# Patient Record
Sex: FEMALE | Race: WHITE
[De-identification: names, ages, dates, MRNs, and addresses within clinical notes are randomized per-mention and may not be internally consistent; named-entity substitution may affect disease eponyms.]

---

## 2018-03-06 ENCOUNTER — HOSPITAL ENCOUNTER (INPATIENT)
Dept: HOSPITAL 12 - SRC | Age: 23
LOS: 6 days | Discharge: OTHER INSTITUTION | DRG: 895 | End: 2018-03-12
Attending: INTERNAL MEDICINE | Admitting: INTERNAL MEDICINE
Payer: COMMERCIAL

## 2018-03-06 VITALS — BODY MASS INDEX: 33.34 KG/M2 | WEIGHT: 220 LBS | HEIGHT: 68 IN

## 2018-03-06 DIAGNOSIS — E87.1: ICD-10-CM

## 2018-03-06 DIAGNOSIS — E87.6: ICD-10-CM

## 2018-03-06 DIAGNOSIS — Z81.1: ICD-10-CM

## 2018-03-06 DIAGNOSIS — F11.23: Primary | ICD-10-CM

## 2018-03-06 DIAGNOSIS — Z87.892: ICD-10-CM

## 2018-03-06 DIAGNOSIS — F13.239: ICD-10-CM

## 2018-03-06 DIAGNOSIS — F41.9: ICD-10-CM

## 2018-03-06 DIAGNOSIS — G47.00: ICD-10-CM

## 2018-03-06 DIAGNOSIS — Z88.6: ICD-10-CM

## 2018-03-06 DIAGNOSIS — Z81.8: ICD-10-CM

## 2018-03-06 DIAGNOSIS — Z79.01: ICD-10-CM

## 2018-03-06 DIAGNOSIS — Z59.0: ICD-10-CM

## 2018-03-06 DIAGNOSIS — E83.42: ICD-10-CM

## 2018-03-06 DIAGNOSIS — Z59.1: ICD-10-CM

## 2018-03-06 DIAGNOSIS — F17.210: ICD-10-CM

## 2018-03-06 DIAGNOSIS — R74.0: ICD-10-CM

## 2018-03-06 DIAGNOSIS — I27.82: ICD-10-CM

## 2018-03-06 LAB
ALP SERPL-CCNC: 71 U/L (ref 50–136)
ALT SERPL W/O P-5'-P-CCNC: 68 U/L (ref 14–59)
AMPHETAMINES UR QL SCN>1000 NG/ML: NEGATIVE
AST SERPL-CCNC: 42 U/L (ref 15–37)
BARBITURATES UR QL SCN: NEGATIVE
BASOPHILS # BLD AUTO: 0 K/UL (ref 0–8)
BASOPHILS NFR BLD AUTO: 0.7 % (ref 0–2)
BILIRUB SERPL-MCNC: 0.7 MG/DL (ref 0.2–1)
BUN SERPL-MCNC: 14 MG/DL (ref 7–18)
CHLORIDE SERPL-SCNC: 101 MMOL/L (ref 98–107)
CO2 SERPL-SCNC: 25 MMOL/L (ref 21–32)
COCAINE UR QL SCN: NEGATIVE
CREAT SERPL-MCNC: 1 MG/DL (ref 0.6–1.3)
EOSINOPHIL # BLD AUTO: 0.2 K/UL (ref 0–0.7)
EOSINOPHIL NFR BLD AUTO: 3.3 % (ref 0–7)
ETHANOL SERPL-MCNC: < 3 MG/DL (ref 0–0)
GLUCOSE SERPL-MCNC: 117 MG/DL (ref 74–106)
HCG UR QL: NEGATIVE
HCT VFR BLD AUTO: 35.5 % (ref 31.2–41.9)
HGB BLD-MCNC: 12 G/DL (ref 10.9–14.3)
LYMPHOCYTES # BLD AUTO: 1.7 K/UL (ref 20–40)
LYMPHOCYTES NFR BLD AUTO: 24.4 % (ref 20.5–51.5)
MAGNESIUM SERPL-MCNC: 1.9 MG/DL (ref 1.8–2.4)
MCH RBC QN AUTO: 27 UUG (ref 24.7–32.8)
MCHC RBC AUTO-ENTMCNC: 34 G/DL (ref 32.3–35.6)
MCV RBC AUTO: 79.7 FL (ref 75.5–95.3)
MONOCYTES # BLD AUTO: 0.5 K/UL (ref 2–10)
MONOCYTES NFR BLD AUTO: 7.3 % (ref 0–11)
NEUTROPHILS # BLD AUTO: 4.4 K/UL (ref 1.8–8.9)
NEUTROPHILS NFR BLD AUTO: 64.3 % (ref 38.5–71.5)
OPIATES UR QL SCN: POSITIVE
PCP UR QL SCN>25 NG/ML: NEGATIVE
PLATELET # BLD AUTO: 271 K/UL (ref 179–408)
POTASSIUM SERPL-SCNC: 2.8 MMOL/L (ref 3.5–5.1)
RBC # BLD AUTO: 4.45 MIL/UL (ref 3.63–4.92)
THC UR QL SCN>50 NG/ML: NEGATIVE
WBC # BLD AUTO: 6.8 K/UL (ref 3.8–11.8)
WS STN SPEC: 7.3 G/DL (ref 6.4–8.2)

## 2018-03-06 PROCEDURE — HZ2ZZZZ DETOXIFICATION SERVICES FOR SUBSTANCE ABUSE TREATMENT: ICD-10-PCS | Performed by: INTERNAL MEDICINE

## 2018-03-06 PROCEDURE — A4663 DIALYSIS BLOOD PRESSURE CUFF: HCPCS

## 2018-03-06 PROCEDURE — G0480 DRUG TEST DEF 1-7 CLASSES: HCPCS

## 2018-03-06 RX ADMIN — GABAPENTIN SCH MG: 300 CAPSULE ORAL at 21:14

## 2018-03-06 SDOH — ECONOMIC STABILITY - HOUSING INSECURITY: HOMELESSNESS: Z59.0

## 2018-03-06 SDOH — ECONOMIC STABILITY - HOUSING INSECURITY: INADEQUATE HOUSING: Z59.1

## 2018-03-06 NOTE — NUR
INTAKE ASSESSMENT



BP: 142/62 HR:101 RR:18, SpO2: 95% T:98.0



Pt is in stable condition and able to be admitted on the unit.  Unit protocols regarding 
mediation administration and vital signs every 4 hours were explained.  Pt verbalized 
understanding.  Will continue admission upon arrival on the unit.

## 2018-03-06 NOTE — NUR
POTASSIUM



Pt noted with potassium 2.8.  MD made aware with new order for K-DUR 40meQ administered as 
ordered.  Breathing is even and unlabored, safety measures in place.  Will continue to 
monitor.
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
## 2018-03-06 NOTE — NUR
REASSESSMENT



Pt lying in bed with eyes closed noted to be asleep.  Breathing is even and unlabored.  
Safety measures in place.  Will continue to monitor.

## 2018-03-06 NOTE — NUR
ONE TIME SUBUTEX/ATIVAN



Pt complains of nausea, body aches, chills, sweats, anxiety, agitation, and irritability.  
Pt noted with flushed face and facial grimacing.  COWS: 12, CIWA:11.  One time Subutex and 
Ativan administered as ordered.  Safety measures in place.  Will continue to monitor.

## 2018-03-07 VITALS — SYSTOLIC BLOOD PRESSURE: 109 MMHG | DIASTOLIC BLOOD PRESSURE: 68 MMHG

## 2018-03-07 VITALS — DIASTOLIC BLOOD PRESSURE: 73 MMHG | SYSTOLIC BLOOD PRESSURE: 133 MMHG

## 2018-03-07 VITALS — SYSTOLIC BLOOD PRESSURE: 122 MMHG | DIASTOLIC BLOOD PRESSURE: 71 MMHG

## 2018-03-07 VITALS — SYSTOLIC BLOOD PRESSURE: 124 MMHG | DIASTOLIC BLOOD PRESSURE: 89 MMHG

## 2018-03-07 VITALS — SYSTOLIC BLOOD PRESSURE: 115 MMHG | DIASTOLIC BLOOD PRESSURE: 72 MMHG

## 2018-03-07 RX ADMIN — Medication SCH EA: at 09:51

## 2018-03-07 RX ADMIN — CLONIDINE HYDROCHLORIDE PRN MG: 0.1 TABLET ORAL at 20:32

## 2018-03-07 RX ADMIN — LORAZEPAM PRN MG: 1 TABLET ORAL at 17:14

## 2018-03-07 RX ADMIN — GABAPENTIN SCH MG: 300 CAPSULE ORAL at 20:32

## 2018-03-07 RX ADMIN — LORAZEPAM PRN MG: 1 TABLET ORAL at 12:14

## 2018-03-07 RX ADMIN — LORAZEPAM PRN MG: 1 TABLET ORAL at 09:22

## 2018-03-07 RX ADMIN — LORAZEPAM PRN MG: 1 TABLET ORAL at 04:29

## 2018-03-07 RX ADMIN — METHOCARBAMOL TABLETS PRN MG: 750 TABLET, COATED ORAL at 04:29

## 2018-03-07 RX ADMIN — BUPRENORPHINE HYDROCHLORIDE SCH MG: 2 TABLET SUBLINGUAL at 14:39

## 2018-03-07 RX ADMIN — BUPRENORPHINE HYDROCHLORIDE SCH MG: 2 TABLET SUBLINGUAL at 09:23

## 2018-03-07 RX ADMIN — GABAPENTIN SCH MG: 300 CAPSULE ORAL at 09:22

## 2018-03-07 RX ADMIN — BUPRENORPHINE HYDROCHLORIDE SCH MG: 2 TABLET SUBLINGUAL at 20:34

## 2018-03-07 NOTE — NUR
Reassess PRN  Ativan 1mg PO, client stated, "I still feel anxious and irritable, I do not 
think this medications you are giving me are working." she continues to have intermittent 
nausea, feeling of panic,and  difficulty concentrating, CIWA 8. Call light within reach.

## 2018-03-07 NOTE — NUR
STAT OF SHIFT



Received 22 year old female patient admitted on 3/6/18 for Heroin withdrawal.  Pt was 
started on a 5 day Subutex taper and is scheduled to start a 5 day Ativan taper tonight.  Pt 
is alert and oriented x4.  She is noted to be impatient, easily agitated, anxious, 
irritable, angry, labile, restless, and depressed.  She complains of chills, diaphoresis, 
difficulty concentrating, muscle pain, and restless legs.  Last COWS:17, CIWA: 12 at 1700.  
Breathing is even and unlabored.  Safety measures in place.  Will continue to monitor.

## 2018-03-07 NOTE — NUR
PRN REASSESSMENT



PRN medication effective.  Pt is lying in bed with eyes closed noted to be asleep.  
Breathing is even and unlabored, respirations 16, safety measures in place.  Will continue 
to monitor.

## 2018-03-07 NOTE — NUR
BEHAVIOR NOTE



Per PCA, pt was noted to be in the bathroom dozing off while sitting on the toilet.  Primary 
nurse and PCA in pt's room monitoring pt for safety.  Pt was starting to get agitated, 
angry, irritable, impatient, uncooperative and defensive with staff.  Pt stated " I'm fine!" 
Assisted pt safely back to bed, encouraged rest and sleep.  Pt then asked " Can I go down to 
smoke?"  Pt was told she is unable to smoke because she was observed to be dozing off in the 
bathroom, and for safety reasons she is not allowed to smoke at this time.  Primary nurse 
firmly told pt she needs to stay in bed and try to sleep.  Pt responded aggressively,    
"Ugh, fine! Whatever."   Side rails up x2 for safety.  Call light within reach.  Will 
continue to monitor.

## 2018-03-07 NOTE — NUR
START OF SHIFT

Endorse rcvd from ongoing nurse, client's room noted with scattered clothes on the floor, 
several bags of potato chips and candy bars open and spilled on the side table and floor. 
Client is in bed in a fetal position, a/o to name, place and situation,  she presents 
anxious mood, flat affect, red, teary eyes, runny nose, dark circles under her eyes, flushed 
face, fine tremors, clammy skin, she has difficulty concentrating. Client reports abdominal 
cramps, body aches, feeling of panic, cold/chills, restless legs, nausea, and fatigue. 
Encourage client to increase PO fluid as tolerated to facilitate detox. Encourage client to 
participate in ADL and to attend group therapy for skills to maintain sober.  PRN Ativan 1mg 
PO for increased anxiety and agitation, chills and sweats, CIWA 9, Robaxin 750mg PO for 
myalgia. Client slept 2 hrs. Intermittent Pneumatic CD at bedside. Seizure precautions 
rendered. Call light within reach.

## 2018-03-07 NOTE — NUR
PRN CLONIDINE



Pt complains of anxiety, agitation, chills, and sweats.  PRN Clonidine administered as 
ordered.  Breathing is even and unlabored.  Safety measures in place.  Will continue to 
monitor.

## 2018-03-07 NOTE — NUR
PRN  Ativan 1mg PO administered for anxiety, irritability, intermittent nausea, feeling of 
panic, restlessness, headache, difficulty concentrating, CIWA 12. Call light within reach.

## 2018-03-07 NOTE — NUR
PRN ATIVAN/ROBAXIN



Pt complains of leg cramps, body aches, increased anxiety and agitation, chills and sweats.  
CIWA:9.  PRN Ativan and Robaxin administered as ordered.  Safety measures in place.  Will 
continue to monitor.

## 2018-03-07 NOTE — NUR
Reassess PRN Ativan 1mg client is able to walk around the hallway, she took a shower. Client 
stated, "I am feeling a little bit better, I am still irritable for some reason."  RASHIAD 8. 
Call light within reach.

## 2018-03-07 NOTE — NUR
END OF SHIFT



Pt is a 22 year old female patient admitted on 3/6/18 for Heroin withdrawal.  Pt remains 
alert and oriented x4.  She is scheduled to start a 5 day Subutex taper today (3/7/18).  She 
received one time orders of Ativan 2 mg and Subutex 4 mg.  She was noted with odd behavior 
during the shift and was observed to be dozing off while in the bathroom and her room.  She 
was noted to be anxious, agitated, irritable, restless, and labile.  She also had complaints 
of body aches, leg cramps, chills and sweats.  She received PRN Ativan 1mg and Robaxin at 
0429.  She took a total of two showers during shift because she said it helps to relieve her 
leg cramps and body aches.  She slept a total of 2 hrs, Intake:855mL, Void:x3, BM:0, Last  
COWS:9 CIWA:9 at 0400.  Breathing is even and unlabored.  Safety measures in place.  Will 
endorse to AM shift.

## 2018-03-07 NOTE — NUR
PRN  Ativan 1mg PO administered for anxiety, irritability, intermittent nausea, feeling of 
panic, restlessness, headache, CIWA 12. Call light within reach.

## 2018-03-07 NOTE — NUR
Reassess PRN  Ativan 1mg PO client is sitting in bed, she is eating a candy bar, watching 
TV, she stated, "I feel a little bit less anxious.", RASHIDA 8. Call light within reach.

## 2018-03-07 NOTE — NUR
END OF SHIFT

Endorsed client to incoming nurse, client is in room, she is a/o x 4, client continues to 
present with anxious mood, flat affect, runny nose, flushed face, tremors, clammy skin, 
difficulty concentrating, abdominal cramps, body aches, feeling of panic, cold/chills, 
restless legs, nausea, and fatigue. Client  is not compliant with  group therapy due to 
withdrawal symptoms. PRN Ativan 1mg PO x 3 administered for CIWA 12, see eMAR. Client 
remains isolated in her room for the most part. Client consumed   ~ 50 % of meals.   
Adequate PO fluid intake 4000mL, void x 10, stool x1. Last CIWA 8/COWS 17  @ 1700. Call 
light within reach.

## 2018-03-07 NOTE — NUR
BEHAVIOR NOTE



Pt still noted to be in bathroom.  Primary nursed checked on pt.  Pt was noted to be sitting 
on toilet facing the wall in her bra.  When asked if pt was okay, pt stated " yeah, I'm fine 
I'm just on my period."  Encouraged pt to get dressed and lay down to try and get some 
sleep.  Pt stated " OK!"  Primary nurse in room to observe for pt safety.  Pt came out of 
the bathroom and stated " I don't know what happened, I don't remember anything after my 
shower."  Reoriented pt, and explained to her what happened.  Pt verbalized understanding.  
Assisted pt safely back to bed.  Will continue to monitor.

## 2018-03-07 NOTE — NUR
BEHAVIOR NOTE



Pt was noted with odd behavior.  Primary nurse checked on pt, pt was in the bathroom for 
half an hour sitting on the toilet observed to be changing feminine products.  Pt stated " 
I'm just on my period !"  Encouraged pt to go back to bed after using the restroom.  Pt 
stated " Okay, I'll be there in a second."  Pt was noted to be fatigued, drowsy, defensive, 
and angry.  Provided redirection, and re-oriented pt.  Pt verbalized understanding.

## 2018-03-07 NOTE — NUR
PRN ATIVAN



Pt noted to be anxious, restless and is continuously going down to smoke.  PRN Ativan 
administered as ordered for CIWA:11.  Safety measures in place.  Will monitor. 

-------------------------------------------------------------------------------

Addendum: 03/08/18 at 0239 by CHAPO TREJO RN

-------------------------------------------------------------------------------

ERROR IN CHARTING. WRONG DATE

## 2018-03-07 NOTE — NUR
PRN  Ativan 1mg PO administered for anxiety and irritability, client stated, "Why do you 
guys have to keep checking on me like every 15 minutes, this is very annoying." she reports 
intermittent nausea, feeling of panic, restlessness, seats, cold/chills,and difficulty 
concentrating, CIWA 12. Call light within reach.

## 2018-03-07 NOTE — NUR
PRN REASSESSMENT



PRN medication effective.  Pt is lying in bed with eyes closed noted to be asleep.  
Breathing is even and unlabored, safety measures in place.  Will monitor.

## 2018-03-08 VITALS — DIASTOLIC BLOOD PRESSURE: 74 MMHG | SYSTOLIC BLOOD PRESSURE: 111 MMHG

## 2018-03-08 VITALS — DIASTOLIC BLOOD PRESSURE: 78 MMHG | SYSTOLIC BLOOD PRESSURE: 119 MMHG

## 2018-03-08 VITALS — SYSTOLIC BLOOD PRESSURE: 120 MMHG | DIASTOLIC BLOOD PRESSURE: 83 MMHG

## 2018-03-08 VITALS — DIASTOLIC BLOOD PRESSURE: 75 MMHG | SYSTOLIC BLOOD PRESSURE: 123 MMHG

## 2018-03-08 VITALS — SYSTOLIC BLOOD PRESSURE: 134 MMHG | DIASTOLIC BLOOD PRESSURE: 89 MMHG

## 2018-03-08 LAB
ALP SERPL-CCNC: 66 U/L (ref 50–136)
ALT SERPL W/O P-5'-P-CCNC: 66 U/L (ref 14–59)
AST SERPL-CCNC: 36 U/L (ref 15–37)
BILIRUB DIRECT SERPL-MCNC: 0.1 MG/DL (ref 0–0.2)
BILIRUB SERPL-MCNC: 0.3 MG/DL (ref 0.2–1)
BUN SERPL-MCNC: 13 MG/DL (ref 7–18)
CHLORIDE SERPL-SCNC: 105 MMOL/L (ref 98–107)
CO2 SERPL-SCNC: 24 MMOL/L (ref 21–32)
CREAT SERPL-MCNC: 0.9 MG/DL (ref 0.6–1.3)
GLUCOSE SERPL-MCNC: 96 MG/DL (ref 74–106)
MAGNESIUM SERPL-MCNC: 1.7 MG/DL (ref 1.8–2.4)
POTASSIUM SERPL-SCNC: 4.4 MMOL/L (ref 3.5–5.1)
WS STN SPEC: 7.1 G/DL (ref 6.4–8.2)

## 2018-03-08 RX ADMIN — BUPRENORPHINE HYDROCHLORIDE SCH MG: 2 TABLET SUBLINGUAL at 14:09

## 2018-03-08 RX ADMIN — GABAPENTIN SCH MG: 300 CAPSULE ORAL at 21:37

## 2018-03-08 RX ADMIN — LORAZEPAM SCH MG: 1 TABLET ORAL at 08:55

## 2018-03-08 RX ADMIN — BUPRENORPHINE HYDROCHLORIDE SCH MG: 2 TABLET SUBLINGUAL at 21:37

## 2018-03-08 RX ADMIN — Medication SCH EA: at 08:57

## 2018-03-08 RX ADMIN — LORAZEPAM PRN MG: 1 TABLET ORAL at 02:21

## 2018-03-08 RX ADMIN — LORAZEPAM SCH MG: 1 TABLET ORAL at 12:31

## 2018-03-08 RX ADMIN — GABAPENTIN SCH MG: 300 CAPSULE ORAL at 08:55

## 2018-03-08 NOTE — NUR
VITALS REFUSED, COWS/CIWA DEFERRED



Pt reported she has difficulty sleeping and did not want to be woken up for 0400 vitals.  
COWS/CIWA deferred d/t pt lying in bed with eyes closed noted to be asleep.  Breathing even 
and unlabored.  Safety measures in place.  Will continue to monitor.

## 2018-03-08 NOTE — NUR
1:1 Sitter

Patient was placed on 1:1 for safety as per orders. Patient is noted falling asleep while 
standing up and at times while on the smoking patio. Explained to patient of 1:1 sitter and 
unit protocols if patient continues with behavior. Patient is able to verbalize 
understanding. Will continue to monitor.

## 2018-03-08 NOTE — NUR
PRN REASSESSMENT



PRN medication effective.  Pt is lying in bed with eyes closed noted to be asleep.  
Breathing is even and unlabored.  Safety measures in place, 1:1 sitter at bedside.  Will 
monitor.

## 2018-03-08 NOTE — NUR
END OF SHIFT

Endorse client to incoming nurse, client is in bed, she sound asleep, easy to arouse, RR 16, 
even-nonlabored. Client was not compliant with group therapy due to withdrawal symptoms 
flushed face, clammy skin, goosebump, tremors, difficulty concentrating, decrease appetite, 
cold/chills. Adequate PO fluid intake 2750mL, void x 10, stool x 1. Consumes ~50 of meals. 
Last ciwa 13/ COWS 14 @ 1600.  Call light within reach.

## 2018-03-08 NOTE — NUR
Start of Shift

Patient Received. Patient is noted in bed sleeping. Breathing even and non labored. Per 
endorsement, patient was removed from 1:1 per MD orders. Patient continues on Modified 
Subutex and Ativan tapers and is tolerating well. Patient is not compliant with group 
therapy due to increased signs and symptoms of withdrawal. Last noted CIWA 13 and COWS 14. 
No PRN medications administered. All needs attended to promptly. Will continue to monitor.

## 2018-03-08 NOTE — NUR
END OF SHIFT



Pt is a 22 year old female patient admitted on 3/6/18 for Heroin withdrawal.  She continues 
on a 5 day Subutex and 5 day Ativan taper and is tolerating well.  Pt remains alert and 
oriented x4.  She was noted to be drowsy, angry, irritable, restless, anxious, and labile 
during the shift.  She had complaints of complains of body aches, restless legs, anxiety, 
and agitation.  During the shift, she was noted to doze off while sitting up in bed and 
using the bathroom.  Pt was placed on a 1:1 for safety.  She received PRN clonidine at 2032 
and PRN Ativan at 0221.  She was able to sleep a total of 4 hrs, Intake: 1460mL, Void: x5, 
BM:0, COWS: 11, CIWA:11 at 0200.  Breathing is even and unlabored.  Safety measures in 
place.  Will endorse to AM shift.

## 2018-03-08 NOTE — NUR
Nursing note

Client refused blood drawn, risk and benefits explained to client, but she still refused. MD 
and CN made aware. NNO at this time.

## 2018-03-08 NOTE — NUR
START OF SHIFT

Endorse rcvd from ongoing nurse, client is sitting in bed, she is a/o to name, place and 
situation. She presents with anxious mood she constantly runs her hands over her hair, 
unable to stop moving her legs, flat affect, flushed face, dark eyes under her eyes and dry 
lips, extremities with clammy skin, goosebump, tremors, difficulty concentrating. Client 
reports feeling really tired, but unable to shut off her thoughts, she stated, "I have this 
feelings of panic, you know, like i just can't relax, it's crazy." decrease appetite, 
cold/chills. Bedside table has 7 open drinks, scattered candies noted on the floor, towels 
on the floor. Encourage client to increase PO fluid intake to facilitate detox. Encourage 
client to attend group therapy to learn skills to maintain sobriety. PRN Ativan 1mg PO, 
Rbaxin 750mg PO and Benadryl 50mg PO administered overnight, see eMar. 1:1 at bedside for 
safety. Call light within reach.

## 2018-03-08 NOTE — NUR
PRN ATIVAN



Pt noted to be anxious, restless and is continuously going down to smoke.  PRN Ativan 
administered as ordered for CIWA:11.  Safety measures in place.  Will monitor.

## 2018-03-09 VITALS — DIASTOLIC BLOOD PRESSURE: 83 MMHG | SYSTOLIC BLOOD PRESSURE: 128 MMHG

## 2018-03-09 VITALS — SYSTOLIC BLOOD PRESSURE: 136 MMHG | DIASTOLIC BLOOD PRESSURE: 81 MMHG

## 2018-03-09 VITALS — DIASTOLIC BLOOD PRESSURE: 84 MMHG | SYSTOLIC BLOOD PRESSURE: 123 MMHG

## 2018-03-09 VITALS — SYSTOLIC BLOOD PRESSURE: 133 MMHG | DIASTOLIC BLOOD PRESSURE: 87 MMHG

## 2018-03-09 VITALS — DIASTOLIC BLOOD PRESSURE: 85 MMHG | SYSTOLIC BLOOD PRESSURE: 123 MMHG

## 2018-03-09 VITALS — SYSTOLIC BLOOD PRESSURE: 116 MMHG | DIASTOLIC BLOOD PRESSURE: 61 MMHG

## 2018-03-09 PROCEDURE — HZ41ZZZ GROUP COUNSELING FOR SUBSTANCE ABUSE TREATMENT, BEHAVIORAL: ICD-10-PCS

## 2018-03-09 PROCEDURE — HZ31ZZZ INDIVIDUAL COUNSELING FOR SUBSTANCE ABUSE TREATMENT, BEHAVIORAL: ICD-10-PCS

## 2018-03-09 RX ADMIN — Medication SCH EA: at 09:17

## 2018-03-09 RX ADMIN — GABAPENTIN SCH MG: 300 CAPSULE ORAL at 09:18

## 2018-03-09 RX ADMIN — BUPRENORPHINE HYDROCHLORIDE SCH MG: 2 TABLET SUBLINGUAL at 14:37

## 2018-03-09 RX ADMIN — GABAPENTIN SCH MG: 300 CAPSULE ORAL at 14:37

## 2018-03-09 RX ADMIN — BUPRENORPHINE HYDROCHLORIDE SCH MG: 2 TABLET SUBLINGUAL at 09:18

## 2018-03-09 RX ADMIN — BACLOFEN SCH MG: 10 TABLET ORAL at 22:00

## 2018-03-09 RX ADMIN — GABAPENTIN SCH MG: 300 CAPSULE ORAL at 22:00

## 2018-03-09 RX ADMIN — METHOCARBAMOL TABLETS PRN MG: 750 TABLET, COATED ORAL at 23:04

## 2018-03-09 RX ADMIN — LORAZEPAM SCH MG: 1 TABLET ORAL at 14:37

## 2018-03-09 RX ADMIN — LORAZEPAM SCH MG: 1 TABLET ORAL at 09:17

## 2018-03-09 RX ADMIN — BUPRENORPHINE HYDROCHLORIDE SCH MG: 2 TABLET SUBLINGUAL at 22:01

## 2018-03-09 NOTE — NUR
START OF SHIFT



PT IS A 23 Y/O F ADMITTED FOR OPIATE W/D. LAST COWS 11 AND CIWA 12. PT IS ON A MODIFIED 
SUBUTEX/ATIVAN TAPER. RECEIVED PT LAYING IN BED WITH SCDS ON, IS A/OX4, RESPIRATIONS EVEN 
AND UNLABORED. PT APPEARS DROWSY, DISHEVELED, AND GIVES NO EYE CONTACT WHEN TALKING. PT 
PRESENTS AGITATION, ANXIOUS MOOD, RESTLESSNESS. PT IS ON A 1:1 FOR SAFETY. SIDE RAILS UPX2, 
BED IS IN LOWEST POSITION. CALL LIGHT WITHIN REACH. WILL CONTINUE TO MONITOR.

## 2018-03-09 NOTE — NUR
End of Shift 

Patient is noted in bed sleeping. Breathing even and non labored. No signs of restlessness 
or discomfort noted. Patient remains on 1:1 for safety. She is compliant with SCD pumps 
while in bed. Patient was place on 1:1 for safety due to patient falling asleep while 
standing up and while outside in the smoking patio. Patient continues on Modified Subutex 
and Ativan tapers and is tolerating well. Last noted CIWA 11 and COWS 12. No PRN medications 
administered. All needs attended to promptly. Will endorse to continue to monitor.

## 2018-03-09 NOTE — NUR
Therapist prompted client to attend all groups while in treatment to increase feelings of 
being connected to others and not be isolated in bedroom. Therapist explained the benefits 
of attending groups such as learning new coping tools, learning about feelings/emotions and 
being able to learn to decrease negative feelings and thoughts

## 2018-03-09 NOTE — NUR
END SHIFT



LAST CIWA @1600. CLONIDINE GIVEN FOR /97, EFFECTIVE UPON REASSESSMENT:127/81. PT 
PARTICIPATED IN ALL GROUPS AND ACTIVITIES. PT IS TOLERATING ATIVAN TAPER WELL. SAFETY 
MEASURES IN PLACE. WILL GIVE ENDORSEMENT TO NIGHT SHIFT.

## 2018-03-09 NOTE — NUR
START OF SHIFT NOTE 

RECEIVED REPORT FROM DAY SHIFT NURSE. PATIENT IS A 22 YEAR OLD FEMALE ADMITTED FOR OPIATE 
WITHDRAWAL. PATIENT IS ON HER 3RD DAY OF HER 5 DAY ATIVAN AND 5 DAY SUBUTEX TAPER. PATIENT 
DID NOT REQUIRE PRN MEDICATION. LAST COWS 12 AND CIWA 10. RECEIVED PATIENT IN THE ROOM, 
ALERT AND ORIENTED X 4. PATIENT SAD, DEPRESSED,  AVOIDANT EYE CONTACT, FLUSHED AND PRESSURED 
SPEECH.  SHE C/O BACK PAIN 6/10. SAFETY MEASURES IN PLACE. CALL LIGHT IN REACH. WILL 
CONTINUE TO MONITOR.

## 2018-03-09 NOTE — NUR
END OF SHIFT



LAST COWS 12 AND CIWA 10  @1600. NO PRNS GIVEN. 1:1 D/C BY MD @1000. PT  WAS UPSET AND 
CRYING ABOUT NOT BEING ABOUT TO GO TO THE REHAB SHE DESIRED; CASE MANAGEMENT FOUND A REHAB 
TO SUIT HER NEEDS. ENCOURAGED PT TO PARTICIPATE AND ATTEND GROUPS TO PROMOTE COPING SKILLS; 
PT VERBALIZED UNDERSTANDING. PT WAS COMPLIANT WITH THERAPEUTIC PLAN OF CARE. SAFETY MEASURES 
IN PLACE. WILL GIVE ENDORSEMENT TO NIGHT SHIFT.

## 2018-03-10 VITALS — DIASTOLIC BLOOD PRESSURE: 60 MMHG | SYSTOLIC BLOOD PRESSURE: 108 MMHG

## 2018-03-10 VITALS — SYSTOLIC BLOOD PRESSURE: 131 MMHG | DIASTOLIC BLOOD PRESSURE: 77 MMHG

## 2018-03-10 VITALS — SYSTOLIC BLOOD PRESSURE: 139 MMHG | DIASTOLIC BLOOD PRESSURE: 66 MMHG

## 2018-03-10 VITALS — DIASTOLIC BLOOD PRESSURE: 88 MMHG | SYSTOLIC BLOOD PRESSURE: 124 MMHG

## 2018-03-10 VITALS — DIASTOLIC BLOOD PRESSURE: 82 MMHG | SYSTOLIC BLOOD PRESSURE: 118 MMHG

## 2018-03-10 VITALS — SYSTOLIC BLOOD PRESSURE: 110 MMHG | DIASTOLIC BLOOD PRESSURE: 54 MMHG

## 2018-03-10 RX ADMIN — BUPRENORPHINE HYDROCHLORIDE SCH MG: 2 TABLET SUBLINGUAL at 15:23

## 2018-03-10 RX ADMIN — METHOCARBAMOL TABLETS PRN MG: 750 TABLET, COATED ORAL at 13:40

## 2018-03-10 RX ADMIN — BACLOFEN SCH MG: 10 TABLET ORAL at 15:23

## 2018-03-10 RX ADMIN — GABAPENTIN SCH MG: 300 CAPSULE ORAL at 15:22

## 2018-03-10 RX ADMIN — BUPRENORPHINE HYDROCHLORIDE SCH MG: 2 TABLET SUBLINGUAL at 20:41

## 2018-03-10 RX ADMIN — LORAZEPAM SCH MG: 1 TABLET ORAL at 20:41

## 2018-03-10 RX ADMIN — BACLOFEN SCH MG: 10 TABLET ORAL at 10:00

## 2018-03-10 RX ADMIN — GABAPENTIN SCH MG: 300 CAPSULE ORAL at 10:00

## 2018-03-10 RX ADMIN — RIVAROXABAN SCH MG: 10 TABLET, FILM COATED ORAL at 10:03

## 2018-03-10 RX ADMIN — BACLOFEN SCH MG: 10 TABLET ORAL at 20:41

## 2018-03-10 RX ADMIN — IBUPROFEN PRN MG: 600 TABLET, FILM COATED ORAL at 10:00

## 2018-03-10 RX ADMIN — LORAZEPAM SCH MG: 1 TABLET ORAL at 15:23

## 2018-03-10 NOTE — NUR
END OF SHIFT NOTE 

PATIENT SLEPT 2 HOURS. FLUID INTAKE OF 1,000 ML. VOIDED X 3. NO BM.  MEDICATION GIVEN AS 
ORDERED  WITH NO ADVERSE REACTION. PATIENT PATIENT WAS GIVEN PRN ROBAXIN FOR RESTLESS LEGS.  
PATIENT NOTED ANXIOUS DURING SHIFT, PATIENT KEEPS ON GOING FOR SMOKE. PATIENT HAD DIFFICULTY 
STAYING ASLEEP. SHE REFUSED TO TAKE BENADRYL , PRN VISTARIL GIVEN. LAST SAFETY MEASURES IN 
PLACE. CALL LIGHT IN REACH. WILL CONTINUE TO MONITOR. COWS 4 AND CIWA 2 AT MIDNIGHT.

## 2018-03-10 NOTE — NUR
START OF SHIFT NOTE 

RECEIVED REPORT FROM DAY SHIFT NURSE. PATIENT IS A 22 YEAR OLD FEMALE ADMITTED FOR 
OPIATE/BENZO WITHDRAWAL.CONTINUE ON ATIVAN AND SUBUTEX TAPER, TOLERATED WELL AND NO ADVERSE 
REACTION NOTED. PATIENT WAS GIVEN VISTARIL AND ROBAXIN. LAST COWS 5 AND CIWA 5. PATIENT WITH 
NEW ORDER OF TRAZADONE. RECEIVED PATIENT IN THE ROOM. PATIENT SAD, FLAT AFFECT,DISHEVELED, 
ANXIOUS, FLUSHED, SWEATING AND C/O BACK PAIN. SAFETY MEASURES IN PLACE. CALL LIGHT IN REACH. 
WILL CONTINUE TO MONITOR

## 2018-03-10 NOTE — NUR
PRN VISTARIL RE-ASSESSMENT 

PATIENT STATES VISTARIL INEFFECTIVE. SHE WILL TRY  TO GO AND SLEEP AFTER SMOKING.

## 2018-03-10 NOTE — NUR
START OF SHIFT 

Received report from night shift nurse. Pt is lying in bed resting and is easily arousable. 
She is a 21 yo female admitted to [facility] on 3/6 for opiate and BZD dependence. She 
continues on 5 day Ativan and Subutex tapers. Pt has difficulty staying asleep and only had 
2 hours of sleep on the night shift. Pt's appearance is disheveled and she is easily 
irritated.  Her eyes are red with dark circles around them. She has hypoactive body movement 
and difficulty concentrating. Safety measures in place.

## 2018-03-10 NOTE — NUR
END OF SHIFT 

Report provided to night shift nurse. Pt is attending a group meeting. She is a 21 yo female 
admitted to [facility] on 3/6 for opiate and BZD dependence. She continues on 5 day Ativan and 
Subutex tapers. Pt is labile and at times irritable. She is unkempt, smells of cigarette 
smoke, and there is food on the floor around her room. PRN Motrin and Robaxin administered.  
Psychiatrist added PRN Trazodone due to difficulty sleeping at night.  Last COWS 5 and CIWA 
5. She drank 3350mL. Pt did not manage to attend all group meetings due to lack of 
motivation. Encouraged attendance. Safety measures in place.

## 2018-03-11 VITALS — DIASTOLIC BLOOD PRESSURE: 61 MMHG | SYSTOLIC BLOOD PRESSURE: 137 MMHG

## 2018-03-11 VITALS — DIASTOLIC BLOOD PRESSURE: 76 MMHG | SYSTOLIC BLOOD PRESSURE: 135 MMHG

## 2018-03-11 VITALS — SYSTOLIC BLOOD PRESSURE: 138 MMHG | DIASTOLIC BLOOD PRESSURE: 88 MMHG

## 2018-03-11 VITALS — DIASTOLIC BLOOD PRESSURE: 74 MMHG | SYSTOLIC BLOOD PRESSURE: 118 MMHG

## 2018-03-11 VITALS — DIASTOLIC BLOOD PRESSURE: 79 MMHG | SYSTOLIC BLOOD PRESSURE: 140 MMHG

## 2018-03-11 RX ADMIN — BACLOFEN SCH MG: 10 TABLET ORAL at 14:21

## 2018-03-11 RX ADMIN — CLONIDINE HYDROCHLORIDE PRN MG: 0.1 TABLET ORAL at 23:56

## 2018-03-11 RX ADMIN — RIVAROXABAN SCH MG: 10 TABLET, FILM COATED ORAL at 08:57

## 2018-03-11 RX ADMIN — GABAPENTIN SCH MG: 300 CAPSULE ORAL at 21:00

## 2018-03-11 RX ADMIN — BACLOFEN SCH MG: 10 TABLET ORAL at 20:59

## 2018-03-11 RX ADMIN — GABAPENTIN SCH MG: 300 CAPSULE ORAL at 14:21

## 2018-03-11 RX ADMIN — GABAPENTIN SCH MG: 300 CAPSULE ORAL at 08:59

## 2018-03-11 RX ADMIN — BACLOFEN SCH MG: 10 TABLET ORAL at 08:56

## 2018-03-11 RX ADMIN — IBUPROFEN PRN MG: 600 TABLET, FILM COATED ORAL at 16:23

## 2018-03-11 NOTE — NUR
PRN MOTRIN

Patient c/o of lower back pain 4/10. PRN Motrin 600mg PO given as ordered. Will cont to 
monitor and reassess the pt.

## 2018-03-11 NOTE — NUR
END OF SHIFT NOTE

Patient is alert awake oriented. Patient presented with anxiety, agitation, worried, labile 
facial expression, back pain. Patient continues with Subutex/Ativan taper tolerating well. 
During shift patient  received PRN Motrin 600mg PO noted to be effective. Vital signs WNL. 
Skin intact warm and dry to touch. Encourage pt to develop coping skills and utilization of 
non pharmacological intervention. Patient was encouraged to participates in groups therapy 
session. Encourage diversional activities to alleviate anxiety. Patient set for discharge in 
am. Patient denies any SI/HI. Safety measures in place. Patient endorsed to night nurse in 
stable condition.

## 2018-03-11 NOTE — NUR
Start of Shift 

Pt is a 22 year old female admitted for Opiate/Benzo withdrawal. Pt placed on 5 day Ativan 
and 5 day Subutex taper - completed. At time of assessment, Pt presents in room, reports 
feeling anxious, clothes is thrown all over room, is hyperverbal regarding discharge 
tomorrow, skin is clammy and flushed, reports body aches. Education provided regarding 
coping mechanisms. Medications due, safety measures in place, will continue to monitor.

## 2018-03-11 NOTE — NUR
CIWA/COWS DEFERRED

PATIENT IN BED WITH EYES CLOSED. VS REFUSED. RESPIRATION EVEN AND UNLABORED. WILL CONTINUE 
TO MONITOR

## 2018-03-11 NOTE — NUR
START OF SHIFT NOTE

Patient is 22 year old female admitted for ETOH/Opioid withdrawal. Patient cont with 5 days 
Ativan/Subutex taper tolerating well. Per endorsement patient was given did not receive any 
PRN'S slept for 5 hours and last CIWA score was-3/COWS-5. Patient received alert awake, 
complaining of back pain, anxious, agitated, hot cold flashes, sweats, unable to 
concentrate, light headed. Educated patient with plan of the day and importance of compliant 
to medication and treatment with good verbal understanding. Safety measures in place. Will 
cont with plan of care.

## 2018-03-11 NOTE — NUR
END OF SHIFT NOTE 

PATIENT SLEPT 5 HOURS. FLUID INTAKE 1,596 ML. VOIDED X 4. NO BM. PATIENT  MONITORED 
THROUGHOUT SHIFT. PATIENT CONTINUE ON ATIVAN AND SUBUTEX TAPER, TOLERATED WELL AND NO 
ADVERSE REACTION NOTED.  PATIENT COMPLIANT WITH TREATMENT PLAN. PATIENT WAS  SAD, FLAT 
AFFECT,DISHEVELED, ANXIOUS, FLUSHED, SWEATING AND C/O BACK PAIN BEGINNING OF SHIFT. PATIENT 
DID NOT REQUIRE PRN MEDICATION. LAST COWS 5 AND CIWA 3. PATIENT SLEPT INTERMITTENTLY BUT 
REFUSED TO HAVE SLEEP MEDS, EXPLAINED RISKS/BENEFITS.  SAFETY MEASURES IN PLACE. CALL LIGHT 
IN REACH. WILL CONTINUE TO MONITOR

## 2018-03-11 NOTE — NUR
PRN Administration 

Clonidine 0.1mg PRN administered for anxiety and restlessness. Pt is noted pacing in room. 

Safety measures in place, will continue to monitor.

## 2018-03-12 VITALS — SYSTOLIC BLOOD PRESSURE: 101 MMHG | DIASTOLIC BLOOD PRESSURE: 62 MMHG

## 2018-03-12 VITALS — SYSTOLIC BLOOD PRESSURE: 128 MMHG | DIASTOLIC BLOOD PRESSURE: 75 MMHG

## 2018-03-12 RX ADMIN — BACLOFEN SCH MG: 10 TABLET ORAL at 08:46

## 2018-03-12 RX ADMIN — RIVAROXABAN SCH MG: 10 TABLET, FILM COATED ORAL at 08:47

## 2018-03-12 RX ADMIN — GABAPENTIN SCH MG: 300 CAPSULE ORAL at 08:46

## 2018-03-12 NOTE — NUR
DISCHARGE NOTE

Patient has been discharged from [facility] Patient is in Stable condition, VS 
WNL. Denies suicidal and homicidal ideations at this time. All documentation has been 
completed, paperwork signed and dated. Pt left with all of her belongings, medications and 
prescriptions. Pt has been discharged from [facility] on 3/12/18 at 0930. MD has been 
Notified.

## 2018-03-12 NOTE — NUR
End of Shift 

Pt is a 22 year old female admitted for Opiate/Benzo withdrawal. Pt placed on 5 day Ativan 
and 5 day Subutex taper - completed. During shift, Pt presented with anxiety, clothes seen 
thrown all over room,  hyperverbal regarding discharge today, skin is clammy and flushed, 
reports body aches - scheduled medications administered, latest COWS 5 & CIWA 5. Education 
provided regarding coping mechanisms - pt verbalized understanding, pt is scheduled for 
discharge today. Clonidine 0.1mg PRN administered for anxiety.  Pt slept for 5 hours, intake 
of 1683 ml PO, voids x4 and stool x0. Safety measures in place, Endorsed to day shift nurse.

## 2018-03-12 NOTE — NUR
PRN Reassessment 

Upon reassessment, pt is noted in bed, sleeping with eyes closed, resp even/unlabored. 
Clonidine effective. safety measures in place, will continue to monitor.

## 2018-03-12 NOTE — NUR
START OF SHIFT NOTE

Patient is 22 year old female admitted for ETOH/Opioid withdrawal. Patient completed her 5 
days Ativan/Subutex taper tolerated well. Per endorsement patient received PRN Clonidine, 
slept for 5 hours and last CIWA score was-5/COWS-5. Patient received alert awake,anxious, 
agitated. Patient scheduled for discharge today. Educated patient with plan of care of rehab 
and importance of compliant to medication and treatment with good verbal understanding. 
Safety measures in place. Will cont with plan of care.

## 2018-03-12 NOTE — NUR
RASHIDA/COWS deferred d/t pt sleeping, to assess while pt is awake as ordered. 

Pt refused to be woken up for VS assessment

Safety measures in place, will continue to monitor.